# Patient Record
(demographics unavailable — no encounter records)

---

## 2024-10-30 NOTE — PHYSICAL EXAM
[LE] : Sensory: Intact in bilateral lower extremities [Normal] : Oriented to person, place, and time, insight and judgement were intact and the affect was normal [de-identified] : CT scan information was reviewed.

## 2024-10-30 NOTE — HISTORY OF PRESENT ILLNESS
[Pain Location] : pain [] : right & left leg [Lumbar] : lumbar region [Stable] : stable [8] : a current pain level of 8/10 [de-identified] :  Patients presents for a follow up visit for lumbosacral stenosis. The patient states that he has done physical therapy since his last visit and has had 5 visits. Patient has followed up with Dr. Connor Bettencourt and received multiple pre-operative scans and was told to follow up with Dr. Rainer Chand for a surgical consult due to the patient's medical comorbidities.

## 2024-10-30 NOTE — DISCUSSION/SUMMARY
[PRN] : PRN [Surgical risks reviewed] : Surgical risks reviewed [de-identified] :  I have discussed the underlying pathophysiology of the patient's condition and reviewed the results of the CT scan of the Lumbar spine obtained on 10/23/2024. We discussed the patient should follow up with Dr. Rainer Chand for a surgical consult. I have also provided the patient Dr. Alex Torres's address and phone number at [facility] should he wish a second opinion.  All questions were answered to his satisfaction.. The patient should follow up with me as needed.

## 2024-11-27 NOTE — REASON FOR VISIT
[Subsequent Evaluation] : a subsequent evaluation for [FreeTextEntry2] : orbital and craniofacial pseudotumor.

## 2024-11-27 NOTE — HISTORY OF PRESENT ILLNESS
[de-identified] : 54 year old man presents for his 6 month follow-up of orbital and craniofacial pseudotumor. Had R orb extent due extensive local disease. Fractions of radiation to right orbit completed on 8/3/2023 with Dr. Hernandez. Most recent PET Scan done 2/11/2024  Reports doing well since last clinic visit - no new issues or complaints.  Denies any vision changes or nasal congestion.  Patient denies throat/neck pain, globus sensation, dysphagia, odynophagia, dyspnea, dysphonia, hemoptysis or otalgia. Denies recent fevers/infections, chills, night sweats, unintentional weight loss.

## 2024-11-27 NOTE — CONSULT LETTER
[Dear  ___] : Dear  [unfilled], [Consult Letter:] : I had the pleasure of evaluating your patient, [unfilled]. [Please see my note below.] : Please see my note below. [Consult Closing:] : Thank you very much for allowing me to participate in the care of this patient.  If you have any questions, please do not hesitate to contact me. [Sincerely,] : Sincerely, [FreeTextEntry2] : Tommy Kimble MD ( [place], [place]) [FreeTextEntry3] : Denton Johnson MD, FACS  [facility] Associate Chair Department of Otolaryngology Professor Otolaryngology & Molecular Medicine [facility]

## 2024-12-03 NOTE — REASON FOR VISIT
[Consultation] : a consultation visit [Referred By: _________] : Patient was referred by DUANE Propranolol Counseling:  I discussed with the patient the risks of propranolol including but not limited to low heart rate, low blood pressure, low blood sugar, restlessness and increased cold sensitivity. They should call the office if they experience any of these side effects.

## 2024-12-03 NOTE — ASSESSMENT
[FreeTextEntry1] : Mr. NEWTON CASTREJON is a 54 year- old- man who presents with a diagnosis of Lumbar Stenosis with junctional kyphosis in the setting of severe osteoporosis and chronic pain.     Imaging and diagnostic workup evaluation show evidence of IMPRESSION: 1.  Multiple chronic compression deformities are again seen. 2.  Moderate to severe chronic compression deformities involve L4 and L5 with linear marrow edema consistent with active compression fractures and slight increased deformity of L4. 3.  Multilevel degenerative disc disease is present with increased grade 1 anterolisthesis at L4-L5. 4.  L4-L5 now demonstrates severe spinal canal stenosis that is increased. 5.  L3-L4 demonstrates moderate spinal canal stenosis. 6.  L2-L3 demonstrate mild to moderate spinal canal stenosis. 7.  There is variable neural foraminal stenosis, greatest at L4-L5.   Plan: -Endocrinology comprehensive evaluation and treatment of osteoporoiss -Comprehensive Pain management -Continue PT    Follow up: in 6 months  Please see Dr. Chand's dictation for details. I, Dr. Madiha Chand evaluated the patient with the nurse practitioner Donal Parikh and established the plan of care. I discussed this patient with the nurse practitioner during the visit. I agree with the assessment and plan as written unless noted below.

## 2024-12-03 NOTE — PHYSICAL EXAM
[General Appearance - Alert] : alert [General Appearance - In No Acute Distress] : in no acute distress [Oriented To Time, Place, And Person] : oriented to person, place, and time [Impaired Insight] : insight and judgment were intact [No Visual Abnormalities] : no visible abnormailities [Non- Antalgic] : non-antalgic [Able to toe walk] : the patient was not able to toe walk [Able to heel walk] : the patient was able to heel walk [FreeTextEntry1] : Right eye vision impairment; Wears eye patch [Outer Ear] : the ears and nose were normal in appearance [] : no respiratory distress [Heart Rate And Rhythm] : heart rate was normal and rhythm regular [Abnormal Walk] : normal gait

## 2024-12-03 NOTE — HISTORY OF PRESENT ILLNESS
[de-identified] : Mr. Wing Wilson is a 54-year-old man who presents for a comprehensive neurosurgical evaluation of his lumbar spine at the request of Dr. Connor Bettencourt. His medical history includes a lumbar laminectomy performed by Dr. Luna, and he has undergone a spinal cord stimulator (SCS) placement and revision surgery.  The patient reports that his symptoms began after the lumbar laminectomy in 2018, which initially helped alleviate his lower back and leg pain for three years. He received an SCS implant two years ago and is currently being treated for osteoporosis with Reclast. 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  During the evaluation, Mr. Wilson mentions that he experiences no pain while sitting. However, his pain increases with walking and is accompanied by numbness and weakness in his feet, which is worse on the left side. He also reports having bladder issues.

## 2024-12-03 NOTE — HISTORY OF PRESENT ILLNESS
[de-identified] : Mr. Wing Wilson is a 54-year-old man who presents for a comprehensive neurosurgical evaluation of his lumbar spine at the request of Dr. Connor Bettencourt. His medical history includes a lumbar laminectomy performed by Dr. Luna, and he has undergone a spinal cord stimulator (SCS) placement and revision surgery.  The patient reports that his symptoms began after the lumbar laminectomy in 2018, which initially helped alleviate his lower back and leg pain for three years. He received an SCS implant two years ago and is currently being treated for osteoporosis with Reclast. 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  During the evaluation, Mr. Wilson mentions that he experiences no pain while sitting. However, his pain increases with walking and is accompanied by numbness and weakness in his feet, which is worse on the left side. He also reports having bladder issues.

## 2024-12-17 NOTE — DISCUSSION/SUMMARY
[FreeTextEntry1] : 54-year-old male with a suspected right base of fifth metacarpal fracture seen on one view today on imaging, this correlates clinically with where he has pain.  We discussed conservative management which included a short arm cast.  I like for him to wear the short arm cast for 2 weeks and then return for repeat x-rays and range of motion check. All questions answered.

## 2024-12-17 NOTE — HISTORY OF PRESENT ILLNESS
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  He reports that his hand and noticed immediate pain and swelling.  He thought the pain and swelling would subside however it did not and he went a few days later to an urgent care where x-rays were concerning for a fracture.  He was subsequently splinted with a removable wrist brace and told to follow-up with orthopedics.  He reports persistent pain and swelling to the ulnar side of the hand and denies any paresthesias.

## 2024-12-17 NOTE — PHYSICAL EXAM
[de-identified] : Right hand + swelling and ecchymosis to the dorsum ulnar hand Tender to palpation at the base of the fifth metacarpal Can make a full fist has full finger extension, no malrotation of the fifth digit [de-identified] : X-ray of the right hand was performed today, 3 views, 12/17/2024, evaluated myself which shows a possible transverse fracture at the base of the fifth metacarpal with cortical irregularity to the ulnar base of the fifth metacarpal

## 2024-12-18 NOTE — PHYSICAL EXAM
[Alert] : alert [No Acute Distress] : no acute distress [No Respiratory Distress] : no respiratory distress [No Accessory Muscle Use] : no accessory muscle use [Normal Rate] : heart rate was normal [Normal Anterior Cervical Nodes] : no anterior cervical lymphadenopathy [No Spinal Tenderness] : no spinal tenderness [No Stigmata of Cushings Syndrome] : no stigmata of Cushings Syndrome [Normal Gait] : normal gait [Normal Strength/Tone] : muscle strength and tone were normal [No Tremors] : no tremors [Oriented x3] : oriented to person, place, and time [Acanthosis Nigricans] : no acanthosis nigricans [de-identified] : R eyelid almost completely closed
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
## 2024-12-18 NOTE — REASON FOR VISIT
[Consultation] : a consultation visit [Osteoporosis] : osteoporosis [FreeTextEntry2] : Dr. Chand ([facility])
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
## 2024-12-18 NOTE — PROCEDURE
[FreeTextEntry1] : Labs scanned in from 4/2024 - vitamin B12 510, free T4 1.6, TSH 2.07, HDL 65, triglycerides 88, , sodium 143, BUN 13, creatinine 0.87, , calcium 9.6, albumin 4.8, LFTs within normal limits including alkaline phosphatase 81, CBC WNL, PSA < 0.01, A1c 5.0%, urine ACR unable to calculate, 25 vitamin D 34.6, uric acid 5.1.  Bone Mineral Density: 10/23/2024  Indication: osteoporosis Spine: -0.3, normal, falsely elevated (should not be measured going forward) Total hip: -2.1, osteopenia Femoral neck: -2.8, osteoporosis, analysis not fully accurate Proximal radius: -0.9, normal  Bone Mineral Density: 2/20/2018 Indication: vs 2015 Spine: Not measured Total hip: -2.4, osteopenia, prior -2.0 Femoral neck: -3.1, osteoporosis, prior -2.9 Proximal radius: -1.7, osteopenia, prior -0.8

## 2024-12-31 NOTE — PHYSICAL EXAM
[de-identified] : Right hand + swelling and ecchymosis to the dorsum ulnar hand - improved from last visit Tender to palpation at the base of the fifth metacarpal Can make a full fist has full finger extension, no malrotation of the fifth digit [de-identified] : X-ray of the right hand was performed today, 3 views, 12/31/2024, evaluated myself which shows a possible transverse fracture at the base (extera-articular) of the fifth metacarpal with slight displacement from previous imaging

## 2024-12-31 NOTE — HISTORY OF PRESENT ILLNESS
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 He reports that his hand and noticed immediate pain and swelling. He thought the pain and swelling would subside however it did not and he went a few days later to an urgent care where x-rays were concerning for a fracture. He was subsequently splinted with a removable wrist brace and told to follow-up with orthopedics.  Interval Hx: 12/31/24: He was placed in a SAC at his last visit for suspected base of fifth metacarpal fracture. Has been compliant with minimal pian.

## 2024-12-31 NOTE — DISCUSSION/SUMMARY
[FreeTextEntry1] :     54-year-old male with a right base of fifth metacarpal fracture seen on imaging today, Cont with SAC x2 weeks. F/u 2 weeks for cast removal and repeat Xrays.

## 2025-01-03 NOTE — REASON FOR VISIT
[Home] : at home, [unfilled] , at the time of the visit. [Medical Office: ([facility])___] : at the medical office located in  [Osteoporosis] : osteoporosis [Follow - Up] : a follow-up visit [Verbal consent obtained from patient] : the patient, [unfilled] [FreeTextEntry2] : Dr. Chand ([facility])
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
## 2025-01-03 NOTE — REASON FOR VISIT
[Home] : at home, [unfilled] , at the time of the visit. [Medical Office: ([facility])___] : at the medical office located in  [Osteoporosis] : osteoporosis [Follow - Up] : a follow-up visit [Verbal consent obtained from patient] : the patient, [unfilled] [FreeTextEntry2] : Dr. Chand ([facility])

## 2025-01-15 NOTE — REASON FOR VISIT
[Follow-Up: _____] : a [unfilled] follow-up visit [FreeTextEntry1] : The patient is here to meet with the [employer] representative for interrogation of the spinal cord stimulator.

## 2025-01-15 NOTE — HISTORY OF PRESENT ILLNESS
[FreeTextEntry1] : NEWTON CASTREJON is a 54 -year -old gentleman who underwent placement of a thoracic [employer] spinal cord stimulator battery on February 2021.  He underwent revision of the right and left spinal cord stimulator electrode arrays and complex programming of the pulse generator battery on 11/18/2022 due to the left lead drifting several body levels.  He had improvement of his pain but has numbness in both feet.  The patient has been followed by Dr. Luna who recommended a lumbar fusion and the patient was referred to Dr. Ortega Chand for consultation since the patient has a history of long- term use of prednisone and has T5 and L2 compression deformities and osteoporosis.  The patient had a CT with contrast done as a part of his work up. The patient needed to have an MRI of the orbits as part of his follow up regarding his history of inflammatory pseudotumor of the right orbit and was unable to put his stimulator into MRI mode.  He stated that the MRI was ultimately done on a lower Dalia machine.  He is here to meet with the [employer] representative to interrogate the stimulator and determine if there are impedances.

## 2025-01-15 NOTE — REASON FOR VISIT
[Follow-Up: _____] : a [unfilled] follow-up visit [FreeTextEntry1] : The patient is here to meet with the [employer] representative for interrogation of the spinal cord stimulator.

## 2025-01-15 NOTE — PHYSICAL EXAM
[FreeTextEntry1] : The patient is alert and oriented to person, place and time.  Higher cortical functions are intact.  He has a right eye vision impairment.  The patient moves all extremities well but has his right hand in a cast due to a fractured metacarpal..  He ambulates with a slightly uneven gait. [General Appearance - Alert] : alert [General Appearance - In No Acute Distress] : in no acute distress [General Appearance - Well Nourished] : well nourished [General Appearance - Well Developed] : well developed [General Appearance - Well-Appearing] : healthy appearing [] : normal voice and communication [Oriented To Time, Place, And Person] : oriented to person, place, and time [Impaired Insight] : insight and judgment were intact [Affect] : the affect was normal [Mood] : the mood was normal [Memory Recent] : recent memory was not impaired [Memory Remote] : remote memory was not impaired

## 2025-01-15 NOTE — ASSESSMENT
[FreeTextEntry1] : The [employer] spinal cord stimulator representative met with the patient and interrogated the stimulator and found that there are impedances in the left lead due to cracks in the lead.  This lead was not being used and the right sided lead was used for programming.  There were two programs that the patient has not used, and the stimulator underwent complex programming of the stimulator making changes to the millivolt frequency and pulse width.  It will be discussed with Dr. Bettencourt whether the stimulator should be removed or revised and if it is removed, can this be done if and when he has surgery with Dr. Chand.  It will also be discussed, in the meantime, whether the patient can have a letter for an MRI facility to be able to continue past the impedance error to the MRI safe mode.  The patient will be called with the results of the discussion.

## 2025-01-15 NOTE — HISTORY OF PRESENT ILLNESS
[FreeTextEntry1] : NEWTON CASTREJON is a 54 -year -old gentleman who underwent placement of a thoracic [employer] spinal cord stimulator battery on February 2021.  He underwent revision of the right and left spinal cord stimulator electrode arrays and complex programming of the pulse generator battery on 11/18/2022 due to the left lead drifting several body levels.  He had improvement of his pain but has numbness in both feet.  The patient has been followed by Dr. Luna who recommended a lumbar fusion and the patient was referred to Dr. Ortega Chand for consultation since the patient has a history of long- term use of prednisone and has T5 and L2 compression deformities and osteoporosis.  The patient had a CT with contrast done as a part of his work up. The patient needed to have an MRI of the orbits as part of his follow up regarding his history of inflammatory pseudotumor of the right orbit and was unable to put his stimulator into MRI mode.  He stated that the MRI was ultimately done on a lower Dalia machine.  He is here to meet with the [employer] representative to interrogate the stimulator and determine if there are impedances.

## 2025-01-15 NOTE — PHYSICAL EXAM
[FreeTextEntry1] : The patient is alert and oriented to person, place and time.  Higher cortical functions are intact.  He has a right eye vision impairment.  The patient moves all extremities well but has his right hand in a cast due to a fractured metacarpal..  He ambulates with a slightly uneven gait. [General Appearance - In No Acute Distress] : in no acute distress [General Appearance - Alert] : alert [General Appearance - Well Nourished] : well nourished [General Appearance - Well Developed] : well developed [General Appearance - Well-Appearing] : healthy appearing [] : normal voice and communication [Oriented To Time, Place, And Person] : oriented to person, place, and time [Impaired Insight] : insight and judgment were intact [Affect] : the affect was normal [Mood] : the mood was normal [Memory Recent] : recent memory was not impaired [Memory Remote] : remote memory was not impaired

## 2025-01-15 NOTE — ASSESSMENT
[FreeTextEntry1] : The [employer] spinal cord stimulator representative met with the patient and interrogated the stimulator and found that there are impedances in the left lead due to cracks in the lead.  This lead was not being used and the right sided lead was used for programming.  There were two programs that the patient has not used, and the stimulator underwent complex programming of the stimulator making changes to the millivolt frequency and pulse width.  It will be discussed with Dr. Bettencourt whether the stimulator should be removed or revised and if it is removed, can this be done if and when he has surgery with Dr. Chand.  It will also be discussed, in the meantime, whether the patient can have a letter for an MRI facility to be able to continue past the impedance error to the MRI safe mode.  The patient will be called with the results of the discussion.

## 2025-01-15 NOTE — ASSESSMENT
[FreeTextEntry1] : The [employer] spinal cord stimulator representative met with the patient and interrogated the stimulator and found that there are impedances in the left lead due to cracks in the lead.  This lead was not being used and the right sided lead was used for programming.  There were two programs that the patient has not used, and the stimulator underwent complex programming of the stimulator making changes to the millivolt frequency and pulse width.  It will be discussed with Dr. Bettencourt whether the stimulator should be removed or revised and if it is removed, can this be done if and when he has surgery with Dr. Chand.  It will also be discussed, in the meantime, whether the patient can have a letter for an MRI facility to be able to continue past the impedance error to the MRI safe mode.  The patient will be called with the results of the discussion.

## 2025-01-15 NOTE — HISTORY OF PRESENT ILLNESS
[FreeTextEntry1] : NEWTON CASTREJON is a 54 -year -old gentleman who underwent placement of a thoracic [employer] spinal cord stimulator battery on February 2021.  He underwent revision of the right and left spinal cord stimulator electrode arrays and complex programming of the pulse generator battery on 11/18/2022 due to the left lead drifting several body levels.  He had improvement of his pain but has numbness in both feet.  The patient has been followed by Dr. Luna who recommended a lumbar fusion and the patient was referred to Dr. Ortega Chand for consultation since the patient has a history of long- term use of prednisone and has T5 and L2 compression deformities and osteoporosis.  The patient had a CT with contrast done as a part of his work up. The patient needed to have an MRI of the orbits as part of his follow up regarding his history of inflammatory pseudotumor of the right orbit and was unable to put his stimulator into MRI mode.  He stated that the MRI was ultimately done on a lower Dalia machine.  He is here to meet with the [employer] representative to interrogate the stimulator and determine if there are impedances.

## 2025-02-28 NOTE — REVIEW OF SYSTEMS
Vaping Use    Vaping status: Never Used   Substance and Sexual Activity    Alcohol use: Not Currently     Comment: quit alcohol at age 50    Drug use: Never     Social Determinants of Health     Financial Resource Strain: Low Risk  (10/18/2024)    Overall Financial Resource Strain (CARDIA)     Difficulty of Paying Living Expenses: Not hard at all   Food Insecurity: No Food Insecurity (2/25/2025)    Hunger Vital Sign     Worried About Running Out of Food in the Last Year: Never true     Ran Out of Food in the Last Year: Never true   Transportation Needs: No Transportation Needs (2/25/2025)    PRAPARE - Transportation     Lack of Transportation (Medical): No     Lack of Transportation (Non-Medical): No    [facility]    [place]   Housing Stability: Low Risk  (2/25/2025)    Housing Stability Vital Sign     Unable to Pay for Housing in the Last Year: No     Number of Times Moved in the Last Year: 0     Homeless in the Last Year: No       Current Facility-Administered Medications   Medication Dose Route Frequency Provider Last Rate Last Admin    levothyroxine (SYNTHROID) tablet 75 mcg  75 mcg Oral Daily Darby Gibson MD        predniSONE (DELTASONE) tablet 50 mg  50 mg Oral Daily Darby Gibson MD        QUEtiapine (SEROQUEL) tablet 12.5 mg  12.5 mg Oral Nightly Evangelista Araujo MD   12.5 mg at 02/27/25 2056    albuterol (PROVENTIL) (2.5 MG/3ML) 0.083% nebulizer solution 2.5 mg  2.5 mg Nebulization Q4H PRN Gareth Ocampo MD        guaiFENesin-dextromethorphan (ROBITUSSIN DM) 100-10 MG/5ML syrup 5 mL  5 mL Oral Q4H PRN Gareth Ocampo MD   5 mL at 02/26/25 2115    famotidine (PEPCID) tablet 20 mg  20 mg Oral Daily Gareth Ocampo MD   20 mg at 02/27/25 1003    benzocaine-menthol (CEPACOL SORE THROAT) lozenge 1 lozenge  1 lozenge Oral Q2H PRN Gareth Ocampo MD   1 lozenge at 02/26/25 1656    lisinopril (PRINIVIL;ZESTRIL)  carvedilol (COREG) tablet 12.5 mg  12.5 mg Oral BID Jan Bello MD   12.5 mg at 02/27/25 2056    budesonide-formoterol (SYMBICORT) 160-4.5 MCG/ACT inhaler 2 puff  2 puff Inhalation BID RT Jan Bello MD   2 puff at 02/28/25 0857    meclizine (ANTIVERT) tablet 25 mg  25 mg Oral TID PRN Jan Bello MD        atorvastatin (LIPITOR) tablet 40 mg  40 mg Oral Daily Jan Bello MD   40 mg at 02/27/25 1003    tiZANidine (ZANAFLEX) tablet 2 mg  2 mg Oral TID PRN Jan Bello MD        hydrALAZINE (APRESOLINE) injection 10 mg  10 mg IntraVENous Q6H PRN Jan Bello MD   10 mg at 02/27/25 1245    warfarin placeholder: dosing by pharmacy   Oral RX Placeholder Jan Bello MD           Allergies   Allergen Reactions    Levofloxacin Swelling    Codeine Hives and Rash    Pulmicort [Budesonide] Rash     Also itchy    Cashew Nut Oil     Levaquin  [Levofloxacin In D5w] Other (See Comments)    Peanut-Containing Drug Products Hives       ROS:   Constitutional                  Negative for fever and chills   HEENT                            Negative for ear discharge, ear pain, nosebleed  Eyes                                Negative for photophobia, pain and discharge  Respiratory                      Positive for dyspnea   Cardiovascular                Negative for orthopnea, claudication and PND  Gastrointestinal               Negative for abdominal pain, diarrhea, blood in stool  Musculoskeletal               Negative for joint pain, negative for myalgia  Skin                                 Negative for rash or itching  Endo/heme/allergies       Negative for polydipsia, environmental allergy  Psychiatric                       Negative for suicidal ideation.  Patient is not anxious    Vitals:    02/28/25 0857   BP:    Pulse: 66   Resp: 18   Temp:    SpO2: 91%     Admission weight: 65.8 kg (145 lb)    Neurological Examination  Constitutional .General exam well groomed   Head/ Ears /Nose/Throat/external  ear .Normal exam  Neck and thyroid .Normal size. No bruits  Cardiovascular: Auscultation of heart with irregular rate and rhythm   Musculoskeletal. Muscle bulk and tone normal                                                           Muscle strength 5/5 strength throughout                                                                                No dysmetria or dysdiadokinesis  No tremor   Normal fine motor  Orientation Alert and oriented x 3 . [facility] February 26 , 2025 . President Trump   Attention and concentration normal  Short term memory 2 words out of 3 in one minute   Language process and speech normal . No aphasia   Cranial nerve 2 normal acuety and visual fields  Cranial nerve 3, 4 and 6 .Extraocular muscles are intact . Pupils are equal and reactive   Cranial nerve 5 . Intact corneal reflex. Normal facial sensation  Cranial nerve 7 normal exam   Cranial nerve 8. Grossly intact hearing   Cranial nerve 9 and 10. Symmetric palate elevation   Cranial nerve 11 , 5 out of 5 strength   Cranial Nerve 12 midline tongue . No atrophy  Sensation .Normal pinprick and light touch   Deep Tendon Reflexes normal  Plantar response flexor bilaterally    Assessment :    Delirium . COPD exacerbation . Pneumonia . Atrial fibrillation     Plan:    He is to be discharged home today . Will sign off . Please call if problem         [As Noted in HPI] : as noted in HPI [Negative] : Heme/Lymph

## 2025-03-07 NOTE — DISCUSSION/SUMMARY
[FreeTextEntry1] : 54-year-old male with a now healed right base of fifth metacarpal fracture 3 months out from the injury.  Full painless range of motion.  We discussed no restrictions and following up on an as-needed basis.

## 2025-03-07 NOTE — PHYSICAL EXAM
[de-identified] : Right hand NO swelling NTTP at the base of the fifth metacarpal Can make a full fist has full finger extension, no malrotation of the fifth digit [de-identified] : X-ray of the right hand was performed today, 3 views, 3/7/25 evaluated myself which shows a healed transverse fracture at the base (extra-articular) of the fifth metacarpal - minimally displaced.

## 2025-04-03 NOTE — HISTORY OF PRESENT ILLNESS
[FreeTextEntry1] : I saw Wing Wilson in the office today for cardiac follow up.  He is a 54-year-old white male who has a history of paroxysmal atrial fibrillation for the past 4 years. He has been doing well on the beta blocker. He is a CHADs 0 and is therefore on no anticoagulation. He did have an echocardiogram 6/15 that showed an ejection fraction of 65%. Mild AI with LVH. On Holter monitor he had 19,000 with 56 VPCs. He saw Dr. Ryan the electrophysiologist to recommended ablation .He saw Dr. Ryan in followup and he was placed on flecainide 50 mg twice a day.   He had a pseudotumor removal from his right eye. He was on steroids, but is now off.    Also has a history of a fracture of his spine probably secondary to osteopenia and is limited in exercise because of pain radiating down his leg. Also spinal stenosis.   The patient had a repeat Holter monitor performed 1/18. He had 11,000 VPCs with about 5000 APCs. He had a stress test 2/21 that showed no ischemia to a workload of 11 mets. It was stopped because of numbness in his leg he had no chest pain. He had no VPCs during the test.   Last echocardiogram showed normal LV function with no significant valvular heart disease.  He has a moderately dilated ascending aorta.  Clinically the patient is having no arrhythmia.   He presents today for routine follow up visit.  He is feeling well overall.  He denies dizzy spells, denies palpitations, denies chest pains or pressures.  Denies worsening pain with physical activity.  Overall has been stable.  He has a history of a dilated ascending aorta, and needs follow up interval imaging. Yes

## 2025-04-03 NOTE — DISCUSSION/SUMMARY
[FreeTextEntry1] : Mr Wilson presents today in no acute distress.  He is euvolemic on exam. ECG illustrates sinus rhythm.    Blood pressures well controlled.   Most recent echocardiogram shows moderately dilated aortic root, which has been stable with CT imaging as well.  He will need a repeat echocardiogram.    He does not present with any symptoms c/w HF, unstable arrhythmia or angina.     He will continue Flecainide 50mg BID for arrhythmia suppression and metoprolol 50mg in addition.  He will continue Lasix 40mg daily.   He will follow annually.   [EKG obtained to assist in diagnosis and management of assessed problem(s)] : EKG obtained to assist in diagnosis and management of assessed problem(s)

## 2025-04-03 NOTE — CARDIOLOGY SUMMARY
[de-identified] : Sinus bradycardia  [de-identified] : 2/16/21\par  exercise stress\par  11METS [de-identified] : 6/2021\par  no evidence of hemodynamically sig stenosis B/L [Normal] : normal [No Ischemia] : no Ischemia [___] : [unfilled] [LVEF ___%] : LVEF [unfilled]%

## 2025-04-03 NOTE — REVIEW OF SYSTEMS
[Lower Ext Edema] : lower extremity edema [Joint Pain] : joint pain [Lower Back Pain] : lower back pain [Rash] : no rash [Dizziness] : no dizziness [Depression] : no depression [Anxiety] : no anxiety [Easy Bleeding] : no tendency for easy bleeding [Easy Bruising] : no tendency for easy bruising [Negative] : Genitourinary [FreeTextEntry9] : HPI

## 2025-04-03 NOTE — PHYSICAL EXAM
[Well Developed] : well developed [Well Nourished] : well nourished [No Acute Distress] : no acute distress [Normal Venous Pressure] : normal venous pressure [No Carotid Bruit] : no carotid bruit [Normal S1, S2] : normal S1, S2 [No Rub] : no rub [No Gallop] : no gallop [Rhythm Regular] : regular [Clear Lung Fields] : clear lung fields [Good Air Entry] : good air entry [No Respiratory Distress] : no respiratory distress  [Soft] : abdomen soft [Non Tender] : non-tender [No Masses/organomegaly] : no masses/organomegaly [Normal Bowel Sounds] : normal bowel sounds [Normal Gait] : normal gait [No Edema] : no edema [No Cyanosis] : no cyanosis [No Clubbing] : no clubbing [No Varicosities] : no varicosities [No Rash] : no rash [No Skin Lesions] : no skin lesions [Moves all extremities] : moves all extremities [No Focal Deficits] : no focal deficits [Normal Speech] : normal speech [Alert and Oriented] : alert and oriented [Normal memory] : normal memory [General Appearance - Well Developed] : well developed [Normal Appearance] : normal appearance [Well Groomed] : well groomed [General Appearance - Well Nourished] : well nourished [No Deformities] : no deformities [General Appearance - In No Acute Distress] : no acute distress [Normal Conjunctiva] : the conjunctiva exhibited no abnormalities [Normal Oral Mucosa] : normal oral mucosa [Normal Jugular Venous A Waves Present] : normal jugular venous A waves present [Normal Jugular Venous V Waves Present] : normal jugular venous V waves present [No Jugular Venous Brown A Waves] : no jugular venous brown A waves [Respiration, Rhythm And Depth] : normal respiratory rhythm and effort [Exaggerated Use Of Accessory Muscles For Inspiration] : no accessory muscle use [Auscultation Breath Sounds / Voice Sounds] : lungs were clear to auscultation bilaterally [Bowel Sounds] : normal bowel sounds [Abdomen Soft] : soft [Abdomen Tenderness] : non-tender [Abnormal Walk] : normal gait [Gait - Sufficient For Exercise Testing] : the gait was sufficient for exercise testing [Nail Clubbing] : no clubbing of the fingernails [Cyanosis, Localized] : no localized cyanosis [Skin Color & Pigmentation] : normal skin color and pigmentation [Skin Turgor] : normal skin turgor [] : no rash [Oriented To Time, Place, And Person] : oriented to person, place, and time [Impaired Insight] : insight and judgment were intact [No Anxiety] : not feeling anxious [Not Palpable] : not palpable [Normal Rate] : normal [Normal S1] : normal S1 [Normal S2] : normal S2 [S3] : no S3 [S4] : no S4 [Distant] : the heart sounds were distant [No Murmur] : no murmurs heard [2+] : left 2+ [Right Carotid Bruit] : no bruit heard over the right carotid [Left Carotid Bruit] : no bruit heard over the left carotid [Right Femoral Bruit] : no bruit heard over the right femoral artery [Left Femoral Bruit] : no bruit heard over the left femoral artery [1+] : left 1+ [No Abnormalities] : the abdominal aorta was not enlarged and no bruit was heard [No Pitting Edema] : no pitting edema present

## 2025-04-03 NOTE — REASON FOR VISIT
[Follow-Up - Clinic] : a clinic follow-up of [Atrial Fibrillation] : atrial fibrillation [Hypertension] : hypertension [Palpitations] : palpitations [FreeTextEntry1] : VPCs, Preop

## 2025-04-15 NOTE — PHYSICAL EXAM
[Alert] : alert [No Acute Distress] : no acute distress [No Respiratory Distress] : no respiratory distress [No Accessory Muscle Use] : no accessory muscle use [Normal Rate] : heart rate was normal [No Spinal Tenderness] : no spinal tenderness [No Stigmata of Cushings Syndrome] : no stigmata of Cushings Syndrome [Normal Gait] : normal gait [Normal Strength/Tone] : muscle strength and tone were normal [Acanthosis Nigricans] : no acanthosis nigricans [No Tremors] : no tremors [Oriented x3] : oriented to person, place, and time [de-identified] : R eyelid almost completely closed
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
## 2025-04-15 NOTE — PHYSICAL EXAM
[Alert] : alert [No Acute Distress] : no acute distress [No Respiratory Distress] : no respiratory distress [No Accessory Muscle Use] : no accessory muscle use [Normal Rate] : heart rate was normal [No Spinal Tenderness] : no spinal tenderness [No Stigmata of Cushings Syndrome] : no stigmata of Cushings Syndrome [Normal Gait] : normal gait [Normal Strength/Tone] : muscle strength and tone were normal [Acanthosis Nigricans] : no acanthosis nigricans [No Tremors] : no tremors [Oriented x3] : oriented to person, place, and time [de-identified] : R eyelid almost completely closed

## 2025-04-15 NOTE — REASON FOR VISIT
[Follow - Up] : a follow-up visit [Osteoporosis] : osteoporosis [Hyperparathyroidism] : hyperparathyroidism [FreeTextEntry2] : Dr. Chand ([facility])
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
## 2025-05-14 NOTE — HISTORY OF PRESENT ILLNESS
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 Had R orb extent due extensive local disease. Fractions of radiation to right orbit completed on 8/3/2023 with Dr. Hernandez. 1/3/2025 MRI  PET Scan done 2/11/2024

## 2025-05-28 NOTE — HISTORY OF PRESENT ILLNESS
[FreeTextEntry1] : I saw Wing Wilson in the office today for cardiac follow up.  He is a 54-year-old white male who has a history of paroxysmal atrial fibrillation. He has been doing well on the beta blocker. He is a CHADs 0 and is therefore on no anticoagulation. He did have an echocardiogram 6/15 that showed an ejection fraction of 65%. Mild AI with LVH. On Holter monitor he had 19,000 with 56 VPCs. He saw Dr. Ryan the electrophysiologist to recommended ablation .He saw Dr. Ryan in followup and he was placed on flecainide 50 mg twice a day.   He had a pseudotumor removal from his right eye. He was on steroids, but is now off.    Also has a history of a fracture of his spine probably secondary to osteopenia and is limited in exercise because of pain radiating down his leg. Also spinal stenosis.   The patient had a repeat Holter monitor performed 1/18. He had 11,000 VPCs with about 5000 APCs. He had a stress test 2/21 that showed no ischemia to a workload of 11 mets. It was stopped because of numbness in his leg he had no chest pain. He had no VPCs during the test.   Last echocardiogram showed normal LV function with no significant valvular heart disease.  He may have RVE and decreased RV function.  He was also noted to have an ascending aorta of 4.8 cm.  Clinically the patient is having no arrhythmia.

## 2025-05-28 NOTE — DISCUSSION/SUMMARY
[FreeTextEntry1] : Mr Wilson presents today in no acute distress.  He is euvolemic on exam. ECG illustrates sinus rhythm.    Blood pressures well controlled.   Last echocardiogram showed normal LV function with no significant valvular heart disease.  He may have RVE and decreased RV function.  He was also noted to have an ascending aorta of 4.8 cm.  Clinically the patient is having no arrhythmia.  I am sending him for a cardiac MRI to assess his RV, and a CTA of the aorta to further assess his RV.    He does not present with any symptoms c/w HF, unstable arrhythmia or angina.     He will continue Flecainide 50mg BID for arrhythmia suppression and metoprolol 50mg in addition.  He will continue Lasix 40mg daily.   He will follow after the above tests [EKG obtained to assist in diagnosis and management of assessed problem(s)] : EKG obtained to assist in diagnosis and management of assessed problem(s)

## 2025-05-28 NOTE — PHYSICAL EXAM
[Well Developed] : well developed [Well Nourished] : well nourished [No Acute Distress] : no acute distress [Normal Venous Pressure] : normal venous pressure [No Carotid Bruit] : no carotid bruit [Normal S1, S2] : normal S1, S2 [No Rub] : no rub [No Gallop] : no gallop [Rhythm Regular] : regular [Clear Lung Fields] : clear lung fields [Good Air Entry] : good air entry [No Respiratory Distress] : no respiratory distress  [Soft] : abdomen soft [Non Tender] : non-tender [No Masses/organomegaly] : no masses/organomegaly [Normal Bowel Sounds] : normal bowel sounds [Normal Gait] : normal gait [No Edema] : no edema [No Cyanosis] : no cyanosis [No Clubbing] : no clubbing [No Varicosities] : no varicosities [No Rash] : no rash [No Skin Lesions] : no skin lesions [Moves all extremities] : moves all extremities [No Focal Deficits] : no focal deficits [Normal Speech] : normal speech [Alert and Oriented] : alert and oriented [Normal memory] : normal memory [General Appearance - Well Developed] : well developed [Normal Appearance] : normal appearance [Well Groomed] : well groomed [General Appearance - Well Nourished] : well nourished [No Deformities] : no deformities [General Appearance - In No Acute Distress] : no acute distress [Normal Conjunctiva] : the conjunctiva exhibited no abnormalities [Normal Oral Mucosa] : normal oral mucosa [Normal Jugular Venous A Waves Present] : normal jugular venous A waves present [Normal Jugular Venous V Waves Present] : normal jugular venous V waves present [No Jugular Venous Brown A Waves] : no jugular venous brown A waves [Respiration, Rhythm And Depth] : normal respiratory rhythm and effort [Exaggerated Use Of Accessory Muscles For Inspiration] : no accessory muscle use [Auscultation Breath Sounds / Voice Sounds] : lungs were clear to auscultation bilaterally [Bowel Sounds] : normal bowel sounds [Abdomen Tenderness] : non-tender [Abdomen Soft] : soft [Abnormal Walk] : normal gait [Gait - Sufficient For Exercise Testing] : the gait was sufficient for exercise testing [Nail Clubbing] : no clubbing of the fingernails [Cyanosis, Localized] : no localized cyanosis [Skin Color & Pigmentation] : normal skin color and pigmentation [Skin Turgor] : normal skin turgor [] : no rash [Oriented To Time, Place, And Person] : oriented to person, place, and time [Impaired Insight] : insight and judgment were intact [No Anxiety] : not feeling anxious [Not Palpable] : not palpable [Normal Rate] : normal [Normal S1] : normal S1 [Normal S2] : normal S2 [S3] : no S3 [S4] : no S4 [Distant] : the heart sounds were distant [No Murmur] : no murmurs heard [2+] : left 2+ [Right Carotid Bruit] : no bruit heard over the right carotid [Left Carotid Bruit] : no bruit heard over the left carotid [Right Femoral Bruit] : no bruit heard over the right femoral artery [Left Femoral Bruit] : no bruit heard over the left femoral artery [1+] : left 1+ [No Abnormalities] : the abdominal aorta was not enlarged and no bruit was heard [No Pitting Edema] : no pitting edema present

## 2025-05-28 NOTE — CARDIOLOGY SUMMARY
[de-identified] : Sinus bradycardia  [de-identified] : 2/16/21\par  exercise stress\par  11METS [de-identified] : 6/2021\par  no evidence of hemodynamically sig stenosis B/L [Normal] : normal [No Ischemia] : no Ischemia [___] : [unfilled] [LVEF ___%] : LVEF [unfilled]%